# Patient Record
Sex: FEMALE | Race: WHITE | NOT HISPANIC OR LATINO | Employment: UNEMPLOYED | URBAN - METROPOLITAN AREA
[De-identification: names, ages, dates, MRNs, and addresses within clinical notes are randomized per-mention and may not be internally consistent; named-entity substitution may affect disease eponyms.]

---

## 2023-03-31 LAB
ALANINE AMINOTRANSFERASE (SGPT) (U/L) IN SER/PLAS: 5 U/L (ref 7–45)
ALBUMIN (G/DL) IN SER/PLAS: 4.6 G/DL (ref 3.4–5)
ALKALINE PHOSPHATASE (U/L) IN SER/PLAS: 48 U/L (ref 33–110)
ANION GAP IN SER/PLAS: 15 MMOL/L (ref 10–20)
ANTI-NUCLEAR ANTIBODY (ANA): NEGATIVE
ASPARTATE AMINOTRANSFERASE (SGOT) (U/L) IN SER/PLAS: 12 U/L (ref 9–39)
BASOPHILS (10*3/UL) IN BLOOD BY AUTOMATED COUNT: 0.04 X10E9/L (ref 0–0.1)
BASOPHILS/100 LEUKOCYTES IN BLOOD BY AUTOMATED COUNT: 0.8 % (ref 0–2)
BILIRUBIN TOTAL (MG/DL) IN SER/PLAS: 0.7 MG/DL (ref 0–1.2)
CALCIDIOL (25 OH VITAMIN D3) (NG/ML) IN SER/PLAS: 24 NG/ML
CALCIUM (MG/DL) IN SER/PLAS: 10.3 MG/DL (ref 8.6–10.6)
CARBON DIOXIDE, TOTAL (MMOL/L) IN SER/PLAS: 27 MMOL/L (ref 21–32)
CHLORIDE (MMOL/L) IN SER/PLAS: 103 MMOL/L (ref 98–107)
CREATININE (MG/DL) IN SER/PLAS: 0.66 MG/DL (ref 0.5–1.05)
EOSINOPHILS (10*3/UL) IN BLOOD BY AUTOMATED COUNT: 0.04 X10E9/L (ref 0–0.7)
EOSINOPHILS/100 LEUKOCYTES IN BLOOD BY AUTOMATED COUNT: 0.8 % (ref 0–6)
ERYTHROCYTE DISTRIBUTION WIDTH (RATIO) BY AUTOMATED COUNT: 11.7 % (ref 11.5–14.5)
ERYTHROCYTE MEAN CORPUSCULAR HEMOGLOBIN CONCENTRATION (G/DL) BY AUTOMATED: 33.4 G/DL (ref 32–36)
ERYTHROCYTE MEAN CORPUSCULAR VOLUME (FL) BY AUTOMATED COUNT: 91 FL (ref 80–100)
ERYTHROCYTES (10*6/UL) IN BLOOD BY AUTOMATED COUNT: 4.83 X10E12/L (ref 4–5.2)
GFR FEMALE: >90 ML/MIN/1.73M2
GLUCOSE (MG/DL) IN SER/PLAS: 73 MG/DL (ref 74–99)
HEMATOCRIT (%) IN BLOOD BY AUTOMATED COUNT: 44 % (ref 36–46)
HEMOGLOBIN (G/DL) IN BLOOD: 14.7 G/DL (ref 12–16)
IMMATURE GRANULOCYTES/100 LEUKOCYTES IN BLOOD BY AUTOMATED COUNT: 0.2 % (ref 0–0.9)
IMMUNOCAP IGE: 5 KU/L (ref 0–214)
LEUKOCYTES (10*3/UL) IN BLOOD BY AUTOMATED COUNT: 5.3 X10E9/L (ref 4.4–11.3)
LYMPHOCYTES (10*3/UL) IN BLOOD BY AUTOMATED COUNT: 1.34 X10E9/L (ref 1.2–4.8)
LYMPHOCYTES/100 LEUKOCYTES IN BLOOD BY AUTOMATED COUNT: 25.4 % (ref 13–44)
MONOCYTES (10*3/UL) IN BLOOD BY AUTOMATED COUNT: 0.41 X10E9/L (ref 0.1–1)
MONOCYTES/100 LEUKOCYTES IN BLOOD BY AUTOMATED COUNT: 7.8 % (ref 2–10)
NEUTROPHILS (10*3/UL) IN BLOOD BY AUTOMATED COUNT: 3.43 X10E9/L (ref 1.2–7.7)
NEUTROPHILS/100 LEUKOCYTES IN BLOOD BY AUTOMATED COUNT: 65 % (ref 40–80)
NRBC (PER 100 WBCS) BY AUTOMATED COUNT: 0 /100 WBC (ref 0–0)
PLATELETS (10*3/UL) IN BLOOD AUTOMATED COUNT: 312 X10E9/L (ref 150–450)
POTASSIUM (MMOL/L) IN SER/PLAS: 4.8 MMOL/L (ref 3.5–5.3)
PROTEIN TOTAL: 7.8 G/DL (ref 6.4–8.2)
SEDIMENTATION RATE, ERYTHROCYTE: 3 MM/H (ref 0–20)
SODIUM (MMOL/L) IN SER/PLAS: 140 MMOL/L (ref 136–145)
THYROPEROXIDASE AB (IU/ML) IN SER/PLAS: <28 IU/ML
THYROTROPIN (MIU/L) IN SER/PLAS BY DETECTION LIMIT <= 0.05 MIU/L: 1.1 MIU/L (ref 0.44–3.98)
UREA NITROGEN (MG/DL) IN SER/PLAS: 9 MG/DL (ref 6–23)

## 2023-04-03 LAB — THYROGLOBULIN AB (IU/ML) IN SER/PLAS: 2.1 IU/ML (ref 0–4)

## 2023-04-05 LAB — TRYPTASE: 2.7 MCG/L

## 2023-04-06 LAB — URTICARIA-INDUCING ACTIVITY: 6.2

## 2023-05-05 ENCOUNTER — APPOINTMENT (OUTPATIENT)
Dept: LAB | Facility: LAB | Age: 23
End: 2023-05-05
Payer: COMMERCIAL

## 2023-05-05 LAB
ALLERGEN MICROORGANISM: CANDIDA ALBICANS IGE (KU/L): <0.1 KU/L
APPEARANCE, URINE: ABNORMAL
BACTERIA, URINE: ABNORMAL /HPF
BASOPHILS (10*3/UL) IN BLOOD BY AUTOMATED COUNT: 0.02 X10E9/L (ref 0–0.1)
BASOPHILS/100 LEUKOCYTES IN BLOOD BY AUTOMATED COUNT: 0.3 % (ref 0–2)
BILIRUBIN, URINE: NEGATIVE
BLOOD, URINE: ABNORMAL
COLOR, URINE: YELLOW
EOSINOPHILS (10*3/UL) IN BLOOD BY AUTOMATED COUNT: 0.02 X10E9/L (ref 0–0.7)
EOSINOPHILS/100 LEUKOCYTES IN BLOOD BY AUTOMATED COUNT: 0.3 % (ref 0–6)
ERYTHROCYTE DISTRIBUTION WIDTH (RATIO) BY AUTOMATED COUNT: 11.7 % (ref 11.5–14.5)
ERYTHROCYTE MEAN CORPUSCULAR HEMOGLOBIN CONCENTRATION (G/DL) BY AUTOMATED: 33.8 G/DL (ref 32–36)
ERYTHROCYTE MEAN CORPUSCULAR VOLUME (FL) BY AUTOMATED COUNT: 91 FL (ref 80–100)
ERYTHROCYTES (10*6/UL) IN BLOOD BY AUTOMATED COUNT: 4.73 X10E12/L (ref 4–5.2)
GLUCOSE, URINE: NEGATIVE MG/DL
HEMATOCRIT (%) IN BLOOD BY AUTOMATED COUNT: 43.2 % (ref 36–46)
HEMOGLOBIN (G/DL) IN BLOOD: 14.6 G/DL (ref 12–16)
IMMATURE GRANULOCYTES/100 LEUKOCYTES IN BLOOD BY AUTOMATED COUNT: 0.1 % (ref 0–0.9)
IMMUNOCAP INTERPRETATION: NORMAL
KETONES, URINE: NEGATIVE MG/DL
LEUKOCYTE ESTERASE, URINE: ABNORMAL
LEUKOCYTES (10*3/UL) IN BLOOD BY AUTOMATED COUNT: 7.1 X10E9/L (ref 4.4–11.3)
LYMPHOCYTES (10*3/UL) IN BLOOD BY AUTOMATED COUNT: 1.71 X10E9/L (ref 1.2–4.8)
LYMPHOCYTES/100 LEUKOCYTES IN BLOOD BY AUTOMATED COUNT: 24.2 % (ref 13–44)
MONOCYTES (10*3/UL) IN BLOOD BY AUTOMATED COUNT: 0.42 X10E9/L (ref 0.1–1)
MONOCYTES/100 LEUKOCYTES IN BLOOD BY AUTOMATED COUNT: 5.9 % (ref 2–10)
MUCUS, URINE: ABNORMAL /LPF
NEUTROPHILS (10*3/UL) IN BLOOD BY AUTOMATED COUNT: 4.9 X10E9/L (ref 1.2–7.7)
NEUTROPHILS/100 LEUKOCYTES IN BLOOD BY AUTOMATED COUNT: 69.2 % (ref 40–80)
NITRITE, URINE: NEGATIVE
NRBC (PER 100 WBCS) BY AUTOMATED COUNT: 0 /100 WBC (ref 0–0)
PH, URINE: 6 (ref 5–8)
PLATELETS (10*3/UL) IN BLOOD AUTOMATED COUNT: 286 X10E9/L (ref 150–450)
PROTEIN, URINE: NEGATIVE MG/DL
RBC, URINE: 8 /HPF (ref 0–5)
SPECIFIC GRAVITY, URINE: 1.02 (ref 1–1.03)
SQUAMOUS EPITHELIAL CELLS, URINE: 12 /HPF
UROBILINOGEN, URINE: <2 MG/DL (ref 0–1.9)
WBC, URINE: 3 /HPF (ref 0–5)

## 2023-05-06 LAB
APPEARANCE, URINE: CLEAR
BILIRUBIN, URINE: NEGATIVE
BLOOD, URINE: NEGATIVE
COLOR, URINE: YELLOW
GLUCOSE, URINE: NEGATIVE MG/DL
KETONES, URINE: ABNORMAL MG/DL
LEUKOCYTE ESTERASE, URINE: NEGATIVE
NITRITE, URINE: NEGATIVE
PH, URINE: 7 (ref 5–8)
PROTEIN, URINE: NEGATIVE MG/DL
SPECIFIC GRAVITY, URINE: 1.01 (ref 1–1.03)
UROBILINOGEN, URINE: <2 MG/DL (ref 0–1.9)

## 2023-05-07 LAB — URINE CULTURE: NORMAL

## 2023-07-04 DIAGNOSIS — L70.9 ACNE, UNSPECIFIED: ICD-10-CM

## 2023-07-05 PROBLEM — B34.9 VIRAL ILLNESS: Status: ACTIVE | Noted: 2023-07-05

## 2023-07-05 PROBLEM — R05.9 COUGH: Status: ACTIVE | Noted: 2023-07-05

## 2023-07-05 PROBLEM — R10.9 ABDOMINAL PAIN: Status: ACTIVE | Noted: 2023-07-05

## 2023-07-05 PROBLEM — J32.9 SINUSITIS: Status: ACTIVE | Noted: 2023-07-05

## 2023-07-05 PROBLEM — J02.9 ACUTE PHARYNGITIS: Status: ACTIVE | Noted: 2023-07-05

## 2023-07-05 PROBLEM — L29.9 PRURITUS: Status: ACTIVE | Noted: 2023-07-05

## 2023-07-05 PROBLEM — R31.29 OTHER MICROSCOPIC HEMATURIA: Status: ACTIVE | Noted: 2023-07-05

## 2023-07-05 PROBLEM — R30.0 DYSURIA: Status: ACTIVE | Noted: 2023-07-05

## 2023-07-05 PROBLEM — N76.0 VAGINITIS: Status: ACTIVE | Noted: 2023-07-05

## 2023-07-05 PROBLEM — L50.3 DERMOGRAPHIA: Status: ACTIVE | Noted: 2023-07-05

## 2023-07-05 PROBLEM — R31.9 HEMATURIA: Status: ACTIVE | Noted: 2023-07-05

## 2023-07-05 PROBLEM — L70.9 ACNE: Status: ACTIVE | Noted: 2023-07-05

## 2023-07-05 PROBLEM — E78.1 HYPERTRIGLYCERIDEMIA: Status: ACTIVE | Noted: 2023-07-05

## 2023-07-05 PROBLEM — R11.10 POST-TUSSIVE EMESIS: Status: ACTIVE | Noted: 2023-07-05

## 2023-07-05 RX ORDER — KETOCONAZOLE 20 MG/G
CREAM TOPICAL
COMMUNITY
Start: 2022-12-09 | End: 2023-07-07 | Stop reason: WASHOUT

## 2023-07-05 RX ORDER — DESONIDE 0.5 MG/G
CREAM TOPICAL 2 TIMES DAILY
COMMUNITY
Start: 2022-10-07 | End: 2023-07-07 | Stop reason: WASHOUT

## 2023-07-05 RX ORDER — HYDROCORTISONE 25 MG/G
CREAM TOPICAL
COMMUNITY
Start: 2022-09-13 | End: 2023-07-07 | Stop reason: WASHOUT

## 2023-07-05 RX ORDER — CLINDAMYCIN PHOSPHATE 10 UG/ML
LOTION TOPICAL
COMMUNITY
Start: 2022-09-13

## 2023-07-05 RX ORDER — TRETINOIN 0.25 MG/G
CREAM TOPICAL
COMMUNITY
Start: 2022-09-13

## 2023-07-05 RX ORDER — CETIRIZINE HYDROCHLORIDE 10 MG/1
10 TABLET ORAL DAILY
COMMUNITY
Start: 2023-03-30

## 2023-07-05 RX ORDER — NORGESTIMATE AND ETHINYL ESTRADIOL 7DAYSX3 LO
1 KIT ORAL DAILY
COMMUNITY
End: 2023-07-07 | Stop reason: SDUPTHER

## 2023-07-05 RX ORDER — KETOCONAZOLE 20 MG/ML
SHAMPOO, SUSPENSION TOPICAL
COMMUNITY
Start: 2023-01-10

## 2023-07-05 RX ORDER — MONTELUKAST SODIUM 10 MG/1
10 TABLET ORAL NIGHTLY
COMMUNITY
Start: 2023-05-23 | End: 2024-06-05

## 2023-07-07 ENCOUNTER — OFFICE VISIT (OUTPATIENT)
Dept: PRIMARY CARE | Facility: CLINIC | Age: 23
End: 2023-07-07
Payer: COMMERCIAL

## 2023-07-07 VITALS
HEART RATE: 76 BPM | DIASTOLIC BLOOD PRESSURE: 65 MMHG | SYSTOLIC BLOOD PRESSURE: 99 MMHG | HEIGHT: 62 IN | OXYGEN SATURATION: 96 % | TEMPERATURE: 98.5 F | RESPIRATION RATE: 16 BRPM | BODY MASS INDEX: 17 KG/M2 | WEIGHT: 92.4 LBS

## 2023-07-07 DIAGNOSIS — R63.6 LOW WEIGHT: ICD-10-CM

## 2023-07-07 DIAGNOSIS — N76.1 CHRONIC VAGINITIS: Primary | ICD-10-CM

## 2023-07-07 DIAGNOSIS — Z00.00 WELL EXAM WITHOUT ABNORMAL FINDINGS OF PATIENT 18 YEARS OF AGE OR OLDER: ICD-10-CM

## 2023-07-07 DIAGNOSIS — N94.6 DYSMENORRHEA: ICD-10-CM

## 2023-07-07 DIAGNOSIS — E55.9 VITAMIN D DEFICIENCY: ICD-10-CM

## 2023-07-07 PROCEDURE — 99395 PREV VISIT EST AGE 18-39: CPT | Performed by: FAMILY MEDICINE

## 2023-07-07 PROCEDURE — 1036F TOBACCO NON-USER: CPT | Performed by: FAMILY MEDICINE

## 2023-07-07 RX ORDER — NORGESTIMATE AND ETHINYL ESTRADIOL 7DAYSX3 LO
1 KIT ORAL DAILY
Qty: 84 TABLET | Refills: 3 | Status: SHIPPED | OUTPATIENT
Start: 2023-07-07 | End: 2024-06-08

## 2023-07-07 RX ORDER — FLUTICASONE PROPIONATE 0.5 MG/G
CREAM TOPICAL
Qty: 15 G | Refills: 0 | Status: SHIPPED | OUTPATIENT
Start: 2023-07-07

## 2023-07-07 NOTE — PROGRESS NOTES
"Subjective   Patient ID: Kaur Day is a 22 y.o. female who presents for Annual Exam (Pt is here for annual physical exam, ABN given to pt and signed, pt verbalized understanding. No concerns at this time ).    HPI     Here for wellness     Not exercising     Nutrition good     Does not eat beef      Denies depression or anxiety    Wants to go to school for dental hygiene     Sex active   - not currently     Both partners were male     Condoms every time     Doesn't eat much because she doesn't feel like making stuff       Review of Systems    No cp, sob, palpitations      Objective   BP 99/65 (BP Location: Left arm, Patient Position: Sitting, BP Cuff Size: Small adult)   Pulse 76   Temp 36.9 °C (98.5 °F) (Temporal)   Resp 16   Ht 1.575 m (5' 2\")   Wt (!) 41.9 kg (92 lb 6.4 oz)   LMP 06/23/2023 (Approximate)   SpO2 96%   BMI 16.90 kg/m²     Physical Exam  Constitutional:       General: She is not in acute distress.     Appearance: Normal appearance.   Cardiovascular:      Rate and Rhythm: Normal rate and regular rhythm.      Heart sounds: Normal heart sounds.   Pulmonary:      Effort: Pulmonary effort is normal.      Breath sounds: Normal breath sounds. No wheezing, rhonchi or rales.   Abdominal:      Palpations: Abdomen is soft.      Tenderness: There is no abdominal tenderness. There is no guarding or rebound.   Musculoskeletal:      Right lower leg: No edema.      Left lower leg: No edema.   Neurological:      Mental Status: She is alert.   Psychiatric:         Mood and Affect: Mood normal.             Assessment/Plan   Diagnoses and all orders for this visit:  Chronic vaginitis  -     fluticasone (Cutivate) 0.05 % cream; Appy to rash/ itchy area twice daily x 5 d  Well exam without abnormal findings of patient 18 years of age or older  Vitamin D deficiency  Low weight       Chronic vaginitis-patient has seen allergy immunology, dermatology, OB/GYN.  She has been tried on antifungals, treated as an " allergic type reaction, and has had a low potency steroid.    None of those have been helpful for vaginal pruritus, though her systemic pruritus was improved somewhat with the montelukast.    It is possible at this point that there is an itch scratch cycle, and quieting the inflammation that might be triggering that itch may be helpful.  Patient was given a small tube of clobetasol to use twice daily for no more than 5 days.  She is aware that this is not for long-term use on the genitals.    Keep your follow-up with Dr. Morgan for next steps, and consideration of Xolair if symptoms persist.    Well exam-patient is up-to-date on all vaccinations, as well as Pap smear.  Colon cancer screening is not needed until the age of 45, breast cancer screening not needed until the age of 40.    Nutrition-patient is advised to increase calorie dense healthy food, such as seeds, nuts, nut butters.   Patient does have a low BMI, BMI today 16.9.  She denies body dysmorphic syndrome, and states sometimes there is just not that much food around the house and she does not feel like making it.  Patient is encouraged to participate in shopping and having some food prepped for easy eating.  Calorie dense very convenient foods can include Greek yogurt, cottage cheese, eggs.    Vaccinations-patient is up-to-date on all vaccinations.  She is not due again for tetanus with whooping cough until 2030.    Follow-up with me in 1 year for well check, or sooner as needed.

## 2023-07-08 RX ORDER — NORGESTIMATE AND ETHINYL ESTRADIOL 7DAYSX3 LO
KIT ORAL
Qty: 84 TABLET | Refills: 1 | Status: SHIPPED | OUTPATIENT
Start: 2023-07-08

## 2024-06-05 DIAGNOSIS — L50.8 OTHER URTICARIA: ICD-10-CM

## 2024-06-05 DIAGNOSIS — R05.8 OTHER COUGH: Primary | ICD-10-CM

## 2024-06-05 RX ORDER — MONTELUKAST SODIUM 10 MG/1
10 TABLET ORAL NIGHTLY
Qty: 90 TABLET | Refills: 0 | Status: SHIPPED | OUTPATIENT
Start: 2024-06-05

## 2024-06-08 DIAGNOSIS — N94.6 DYSMENORRHEA: ICD-10-CM

## 2024-06-08 RX ORDER — NORGESTIMATE AND ETHINYL ESTRADIOL 7DAYSX3 LO
1 KIT ORAL DAILY
Qty: 84 TABLET | Refills: 0 | Status: SHIPPED | OUTPATIENT
Start: 2024-06-08

## 2024-07-08 ENCOUNTER — APPOINTMENT (OUTPATIENT)
Dept: PRIMARY CARE | Facility: CLINIC | Age: 24
End: 2024-07-08
Payer: COMMERCIAL

## 2024-07-26 ENCOUNTER — PATIENT OUTREACH (OUTPATIENT)
Dept: CARE COORDINATION | Facility: CLINIC | Age: 24
End: 2024-07-26
Payer: COMMERCIAL

## 2024-07-26 ENCOUNTER — DOCUMENTATION (OUTPATIENT)
Dept: CARE COORDINATION | Facility: CLINIC | Age: 24
End: 2024-07-26
Payer: COMMERCIAL

## 2024-07-26 RX ORDER — KETOROLAC TROMETHAMINE 10 MG/1
10 TABLET, FILM COATED ORAL EVERY 6 HOURS PRN
COMMUNITY

## 2024-07-26 RX ORDER — TAMSULOSIN HYDROCHLORIDE 0.4 MG/1
0.4 CAPSULE ORAL NIGHTLY
COMMUNITY

## 2024-07-26 RX ORDER — CIPROFLOXACIN 500 MG/1
500 TABLET ORAL 2 TIMES DAILY
COMMUNITY

## 2024-07-26 RX ORDER — PHENAZOPYRIDINE HYDROCHLORIDE 200 MG/1
200 TABLET, FILM COATED ORAL 3 TIMES DAILY PRN
COMMUNITY

## 2024-07-26 NOTE — PROGRESS NOTES
Discharge Facility:[facility]  Discharge Diagnosis:Nephrolithiasis  Admission Date:7/25/2024  Discharge Date: 7/25/2024    PCP Appointment Date:7/30/2024 for CPE  Specialist Appointment Date: Urology TBD   Hospital Encounter and Summary Linked: Yes  See discharge assessment below for further details  Engagement  Call Start Time: 1200 (7/26/2024 12:03 PM)    Medications  Medications reviewed with patient/caregiver?: Yes (7/26/2024 12:03 PM)  Is the patient having any side effects they believe may be caused by any medication additions or changes?: No (7/26/2024 12:03 PM)  Does the patient have all medications ordered at discharge?: Yes (7/26/2024 12:03 PM)  Care Management Interventions: No intervention needed (7/26/2024 12:03 PM)  Prescription Comments: -- (Cipro 500 mg one bid x 7 days, Torodol 10 mg one q 6 hours prn, Pyridium 200 mg one tid prn, Flomax 0.4 one q hs x 7 days) (7/26/2024 12:03 PM)  Is the patient taking all medications as directed (includes completed medication regime)?: Yes (7/26/2024 12:03 PM)    Appointments  Does the patient have a primary care provider?: Yes (7/26/2024 12:03 PM)  Care Management Interventions: Verified appointment date/time/provider (7/30/2024 for CPE) (7/26/2024 12:03 PM)  Has the patient kept scheduled appointments due by today?: Yes (7/26/2024 12:03 PM)  Care Management Interventions: Advised patient to keep appointment (7/26/2024 12:03 PM)    Self Management  What is the home health agency?: -- (N/A) (7/26/2024 12:03 PM)  What Durable Medical Equipment (DME) was ordered?: -- (N/A) (7/26/2024 12:03 PM)    Patient Teaching  Does the patient have access to their discharge instructions?: Yes (7/26/2024 12:03 PM)  What is the patient's perception of their health status since discharge?: Improving (7/26/2024 12:03 PM)  Is the patient/caregiver able to teach back the hierarchy of who to call/visit for symptoms/problems? PCP, Specialist, Home Health nurse, Urgent Care, ED, 911:  Yes (7/26/2024 12:03 PM)    Wrap Up  Wrap Up Additional Comments: -- (Summer is doing ok. She is straining her urine and stent was placed. She is to recieve call from urology for schedule of Lithotripsy. She has CPE scheduled with PCP next week so will keep. She will contact provider if any concerns) (7/26/2024 12:03 PM)

## 2024-07-29 PROBLEM — Z00.00 HEALTHCARE MAINTENANCE: Status: ACTIVE | Noted: 2024-07-29

## 2024-07-29 NOTE — PROGRESS NOTES
Subjective   Patient ID: Kaur Day is a 23 y.o. female who presents for Annual Exam.    HPI     Patient presents today for annual physical + hospital discharge follow-up.    Interim Hx:  Patient hospitalized at [facility] 7/25/2024 for right ureteral stent placement 2/2 nephrolithiasis and hydronephrosis. Summary per visit note:     She presented to [place] ER morning of 7/25/2024 with right sided flank pain that has been present for last few days. She endorses chills, no fever. + nausea, mild dysuria. No hematuria.     In the ER, she had no leukocytosis, SCr 0.75, UA was nitriite +. Urine culture sent. She was started on ceftriaxone. She has been afebrile. She was transferred to [facility] for further management of stone.      Pertinent Hospital workup:  Wbc: 9.14  Hgb: 13.4  Scr: 0.75  UA: orange, 3+ hgb, nitrite +, 6-10 wbc, >25 rbc, many bacteria  Ucx: in process   CT A/P: Mild right hydroureteronephrosis with a 5 mm stone in the region of the distal right ureter just proximal to the ureterovesical junction.  Patient was admitted for the above surgery. Patient was transferred to the recovery unit and then to the regular nursing floor. Diet was slowly advanced as tolerated. Activity level was gradually increased. Pain was controlled on oral medications. The patient was then deemed fit for discharge.    Consulted urology, seen by Dr. Beck in hospital with plan as follows:  -OR today for surgical management of the stone  - NPO   - Wbc: 9.14  - Hgb: 13.4  - Scr: 0.75  - UA: orange, 3+ hgb, nitrite +, 6-10 wbc, >25 rbc, many bacteria  - Urine culture: in process  - IVF  - IV Abx- had ceftriaxone at 430am in ER  - Start Flomax  - Pain/nausea control  - check KUB  - Strain urine  - Page with fevers/hypotension  - Options for stone treatment to be discussed with the patient including MET. Pt opting for surgical management of stone    Patient discharged post-op on Cirpo, Toradol, Flomax, and Pyridium.    Remains on the Cipro  at this time.  Patient has been straining when going to the bathroom.  Patient reports she will be having stent removed this Thursday  She endorses that she was not having water intake when on vacation in [place].  Hx of stones in 2018, passed these on her own, had stone analysis which showed calcium phosphate 80%, urine showed low citrate.    WELLNESS VISIT   TDAP: 10/2020  GARDASIL: completed  PAP: 5/2023 neg, managed via GYN  MAMMO: n/a  CSCOPE: n/a  HEP C SCREEN: 1/2023 negative  LIPID: 2/2020 TC/HDL ratio 4.0,     Alcohol use: not currently  Smoking: never smoker    Dental: utd, works at dental office  Vision: utd    STI screening: no concern for STIs per patient    Cervical cancer screening: utd via GYN  Denies family history in first degree relative.  Denies pelvic pain, vaginal discharge, or vaginal bleeding.    Breast cancer screening: n/a  Denies family history in first degree relative.  Denies lumps/bumps, skin changes, nipple retraction, or nipple drainage.    Colon cancer screening: n/a  Denies family history in first degree relative.  Denies melena, hematochezia, constipation, diarrhea, bloating, change in bowel habits.    Thyroid disorder screening:   Denies family history in first degree relative.  Denies heat or cold intolerance, diarrhea or constipation, coarsening of hair, and palpitations.     Cardiac disorder screening:   Denies family history in first degree relative.  Denies chest pain, SOB, palpitations, edema, dizziness.     Review of Systems   All other systems reviewed and are negative.      Objective   /64 (BP Location: Right arm, Patient Position: Sitting, BP Cuff Size: Adult)   Pulse 81   Temp 36.9 °C (98.4 °F) (Temporal)   Wt (!) 44.4 kg (97 lb 14.4 oz)   LMP 07/21/2024   SpO2 94%   BMI 17.91 kg/m²     Physical Exam  Vitals and nursing note reviewed.   Constitutional:       General: She is not in acute distress.     Appearance: Normal appearance. She is not  toxic-appearing.   HENT:      Head: Normocephalic and atraumatic.      Mouth/Throat:      Mouth: Mucous membranes are moist.      Pharynx: Oropharynx is clear.   Eyes:      Extraocular Movements: Extraocular movements intact.      Pupils: Pupils are equal, round, and reactive to light.   Neck:      Thyroid: No thyromegaly.      Vascular: No hepatojugular reflux or JVD.   Cardiovascular:      Rate and Rhythm: Normal rate and regular rhythm.      Heart sounds: No murmur heard.     No friction rub. No gallop.   Pulmonary:      Effort: Pulmonary effort is normal.      Breath sounds: Normal breath sounds. No wheezing, rhonchi or rales.   Abdominal:      General: Bowel sounds are normal. There is no distension.      Palpations: Abdomen is soft. There is no mass.      Tenderness: There is no abdominal tenderness. There is no guarding.   Musculoskeletal:      Right lower leg: No edema.      Left lower leg: No edema.   Lymphadenopathy:      Cervical: No cervical adenopathy.   Skin:     General: Skin is warm and dry.   Neurological:      General: No focal deficit present.      Mental Status: She is alert and oriented to person, place, and time.   Psychiatric:         Mood and Affect: Mood normal.         Behavior: Behavior normal.         Assessment/Plan   Problem List Items Addressed This Visit             ICD-10-CM    Healthcare maintenance - Primary Z00.00     Vaccines and screenings reviewed.  Questionnaires completed.  Health and wellness topics reviewed.  Diet and exercise recommendations revisited.  Routine blood work deferred today, recently in ER    VACCINES:  -TDAP is good until 2030  -Gardasil previously completed, good for lifetime    SCREENING:  -No concern for STIs per patient  -Screening pap last completed 5/2023, utd managed by GYN  -Screening mammogram indicated at age 40  -Screening colonoscopy indicated at age 45    LIFESTYLE MEASURES  -consider increasing protein intake provided no issues with kidneys to 1  gram per 1 pound of ideal body weight per not to exceed 150 gram per day. May have to supplement with a protein powder to achieve this goal.  -make sure you are avoiding refined carbs such as breads, pasta, cereal, candy, soda,  nutrition bars, granola, chips, and sugar sweetened beverages.      -eat 5- 7 servings daily of veggies,  healthy protein such as chicken, fish,  beans, and eggs, and include healthy fats in your diet such as seeds, nuts, olive oil, avocados, and salmon.   -exercise 4 - 6 days per week as you are able, 150 minutes total weekly divided up is recommended with 3-4 of those days including resistance/strength training.  -Vitamin D is recommended at 1000 - 5000 IU international units daily.   -Always wear sunscreen when you have sun exposure.  -64 oz of water is recommended daily.  -Dental visits recommended every 6 months.  -Eye exam recommended every 2 years, for those with vision problems every year.           Nephrolithiasis N20.0     S/p ER visit, still has stent in placed, anticipating removal/lithotripsy via urology in 3-4 days.  Discharged by ER on Cipro, Toradol, Flomax, and Pyridium, continue as prescribed.  She will follow-up as outpatient with urology for continued management.  Her urologist is Dr. Beck at [facility].    Remote history of stones, had stone analysis which showed calcium phosphate 80%, urine showed low citrate which would have been treated with uro cit K and to push lemonade.    Make sure to drink at least 64 oz of water daily - preferably 3L per day.  Drink at least  8 - 12 oz of water at night before bed.          Ureteral stone with hydronephrosis N13.2    Hospital discharge follow-up Z09     Patient hospitalized at [facility] 7/25/2024 for right ureteral stent placement 2/2 nephrolithiasis and hydronephrosis. Summary per visit note:  She presented to [place] ER morning of 7/25/2024 with right sided flank pain that has been present for last few days. She endorses chills, no fever.  + nausea, mild dysuria. No hematuria.     In the ER, she had no leukocytosis, SCr 0.75, UA was nitriite +. Urine culture sent. She was started on ceftriaxone. She has been afebrile. She was transferred to [facility] for further management of stone.      Pertinent Hospital workup:  Wbc: 9.14  Hgb: 13.4  Scr: 0.75  UA: orange, 3+ hgb, nitrite +, 6-10 wbc, >25 rbc, many bacteria  Ucx: in process   CT A/P: Mild right hydroureteronephrosis with a 5 mm stone in the region of the distal right ureter just proximal to the ureterovesical junction.  Patient was admitted for the above surgery. Patient was transferred to the recovery unit and then to the regular nursing floor. Diet was slowly advanced as tolerated. Activity level was gradually increased. Pain was controlled on oral medications. The patient was then deemed fit for discharge.    Consulted urology, seen by Dr. Beck in hospital with plan as follows:  -OR today for surgical management of the stone  - NPO   - Wbc: 9.14  - Hgb: 13.4  - Scr: 0.75  - UA: orange, 3+ hgb, nitrite +, 6-10 wbc, >25 rbc, many bacteria  - Urine culture: in process  - IVF  - IV Abx- had ceftriaxone at 430am in ER  - Start Flomax  - Pain/nausea control  - check KUB  - Strain urine  - Page with fevers/hypotension  - Options for stone treatment to be discussed with the patient including MET. Pt opting for surgical management of stone    Patient discharged post-op on Cirpo, Toradol, Flomax, and Pyridium.    Hospital course and summary reviewed and discussed with patient.  Patient remains on meds from discharge  Awaiting outpatient follow-up with urology @ [facility] this week, anticipating lithrotripsy.          Other Visit Diagnoses         Codes    Body mass index (BMI) less than 19 in adult     Z68.1          Follow-up in 1 year for annual physical.   Call for sooner follow-up if needed.         Scribe Attestation  By signing my name below, Jackie SANCHEZ Scribe   attest that this documentation has  been prepared under the direction and in the presence of Mavis John DO.

## 2024-07-30 ENCOUNTER — APPOINTMENT (OUTPATIENT)
Dept: PRIMARY CARE | Facility: CLINIC | Age: 24
End: 2024-07-30
Payer: COMMERCIAL

## 2024-07-30 VITALS
HEART RATE: 81 BPM | DIASTOLIC BLOOD PRESSURE: 64 MMHG | WEIGHT: 97.9 LBS | TEMPERATURE: 98.4 F | BODY MASS INDEX: 17.91 KG/M2 | OXYGEN SATURATION: 94 % | SYSTOLIC BLOOD PRESSURE: 105 MMHG

## 2024-07-30 DIAGNOSIS — N20.0 NEPHROLITHIASIS: ICD-10-CM

## 2024-07-30 DIAGNOSIS — Z00.00 HEALTHCARE MAINTENANCE: Primary | ICD-10-CM

## 2024-07-30 DIAGNOSIS — N13.2 URETERAL STONE WITH HYDRONEPHROSIS: ICD-10-CM

## 2024-07-30 DIAGNOSIS — Z09 HOSPITAL DISCHARGE FOLLOW-UP: ICD-10-CM

## 2024-07-30 PROBLEM — J32.9 SINUSITIS: Status: RESOLVED | Noted: 2023-07-05 | Resolved: 2024-07-30

## 2024-07-30 PROBLEM — R31.9 HEMATURIA: Status: RESOLVED | Noted: 2023-07-05 | Resolved: 2024-07-30

## 2024-07-30 PROBLEM — N76.0 VAGINITIS: Status: RESOLVED | Noted: 2023-07-05 | Resolved: 2024-07-30

## 2024-07-30 PROBLEM — R31.29 OTHER MICROSCOPIC HEMATURIA: Status: RESOLVED | Noted: 2023-07-05 | Resolved: 2024-07-30

## 2024-07-30 PROBLEM — L29.9 PRURITUS: Status: RESOLVED | Noted: 2023-07-05 | Resolved: 2024-07-30

## 2024-07-30 PROBLEM — R10.9 ABDOMINAL PAIN: Status: RESOLVED | Noted: 2023-07-05 | Resolved: 2024-07-30

## 2024-07-30 PROBLEM — N39.0 URINARY TRACT INFECTION WITH HEMATURIA: Status: ACTIVE | Noted: 2024-07-25

## 2024-07-30 PROBLEM — R30.0 DYSURIA: Status: RESOLVED | Noted: 2023-07-05 | Resolved: 2024-07-30

## 2024-07-30 PROBLEM — R05.9 COUGH: Status: RESOLVED | Noted: 2023-07-05 | Resolved: 2024-07-30

## 2024-07-30 PROBLEM — R31.9 URINARY TRACT INFECTION WITH HEMATURIA: Status: ACTIVE | Noted: 2024-07-25

## 2024-07-30 PROBLEM — J02.9 ACUTE PHARYNGITIS: Status: RESOLVED | Noted: 2023-07-05 | Resolved: 2024-07-30

## 2024-07-30 PROBLEM — R31.9 URINARY TRACT INFECTION WITH HEMATURIA: Status: RESOLVED | Noted: 2024-07-25 | Resolved: 2024-07-30

## 2024-07-30 PROBLEM — R11.10 POST-TUSSIVE EMESIS: Status: RESOLVED | Noted: 2023-07-05 | Resolved: 2024-07-30

## 2024-07-30 PROBLEM — B34.9 VIRAL ILLNESS: Status: RESOLVED | Noted: 2023-07-05 | Resolved: 2024-07-30

## 2024-07-30 PROBLEM — N39.0 URINARY TRACT INFECTION WITH HEMATURIA: Status: RESOLVED | Noted: 2024-07-25 | Resolved: 2024-07-30

## 2024-07-30 PROCEDURE — 99495 TRANSJ CARE MGMT MOD F2F 14D: CPT | Performed by: FAMILY MEDICINE

## 2024-07-30 PROCEDURE — 1036F TOBACCO NON-USER: CPT | Performed by: FAMILY MEDICINE

## 2024-07-30 PROCEDURE — 99395 PREV VISIT EST AGE 18-39: CPT | Performed by: FAMILY MEDICINE

## 2024-07-30 ASSESSMENT — PATIENT HEALTH QUESTIONNAIRE - PHQ9
2. FEELING DOWN, DEPRESSED OR HOPELESS: NOT AT ALL
SUM OF ALL RESPONSES TO PHQ9 QUESTIONS 1 AND 2: 0
1. LITTLE INTEREST OR PLEASURE IN DOING THINGS: NOT AT ALL

## 2024-07-30 NOTE — ASSESSMENT & PLAN NOTE
Vaccines and screenings reviewed.  Questionnaires completed.  Health and wellness topics reviewed.  Diet and exercise recommendations revisited.  Routine blood work deferred today, recently in ER    VACCINES:  -TDAP is good until 2030  -Gardasil previously completed, good for lifetime    SCREENING:  -No concern for STIs per patient  -Screening pap last completed 5/2023, utd managed by GYN  -Screening mammogram indicated at age 40  -Screening colonoscopy indicated at age 45    LIFESTYLE MEASURES  -consider increasing protein intake provided no issues with kidneys to 1 gram per 1 pound of ideal body weight per not to exceed 150 gram per day. May have to supplement with a protein powder to achieve this goal.  -make sure you are avoiding refined carbs such as breads, pasta, cereal, candy, soda,  nutrition bars, granola, chips, and sugar sweetened beverages.      -eat 5- 7 servings daily of veggies,  healthy protein such as chicken, fish,  beans, and eggs, and include healthy fats in your diet such as seeds, nuts, olive oil, avocados, and salmon.   -exercise 4 - 6 days per week as you are able, 150 minutes total weekly divided up is recommended with 3-4 of those days including resistance/strength training.  -Vitamin D is recommended at 1000 - 5000 IU international units daily.   -Always wear sunscreen when you have sun exposure.  -64 oz of water is recommended daily.  -Dental visits recommended every 6 months.  -Eye exam recommended every 2 years, for those with vision problems every year.

## 2024-07-30 NOTE — ASSESSMENT & PLAN NOTE
S/p ER visit, still has stent in placed, anticipating removal/lithotripsy via urology in 3-4 days.  Discharged by ER on Cipro, Toradol, Flomax, and Pyridium, continue as prescribed.  She will follow-up as outpatient with urology for continued management.  Her urologist is Dr. Beck at [facility].    Remote history of stones, had stone analysis which showed calcium phosphate 80%, urine showed low citrate which would have been treated with uro cit K and to push lemonade.    Make sure to drink at least 64 oz of water daily - preferably 3L per day.  Drink at least  8 - 12 oz of water at night before bed.

## 2024-07-30 NOTE — PATIENT INSTRUCTIONS
Kidney stone -     push fluid -   goal 3 liters water daily    Previous stone analysis and urine analysis suggests that acidified urine (lemonade, lemon/lime water) may decrease risk of stone formation,     at that time urocit-K was discussed,  patient disinclined at that time -        Summer to discuss with urology at follow up if that would be recommended,      current stone not captured with straining     Nephrolithiasis  S/p ER visit, still has stent in placed, anticipating removal/lithotripsy via urology in 3-4 days.  Discharged by ER on Cipro, Toradol, Flomax, and Pyridium, continue as prescribed.  She will follow-up as outpatient with urology for continued management.  Her urologist is Dr. Beck at [facility].    Remote history of stones, had stone analysis which showed calcium phosphate 80%, urine showed low citrate which would have been treated with uro cit K and to push lemonade.    Make sure to drink at least 64 oz of water daily - preferably 3L per day.  Drink at least  8 - 12 oz of water at night before bed.     Healthcare maintenance  Vaccines and screenings reviewed.  Questionnaires completed.  Health and wellness topics reviewed.  Diet and exercise recommendations revisited.  Routine blood work deferred today, recently in ER    VACCINES:  -TDAP is good until 2030  -Gardasil previously completed, good for lifetime    SCREENING:  -No concern for STIs per patient  -Screening pap last completed 5/2023, utd managed by GYN  -Screening mammogram indicated at age 40  -Screening colonoscopy indicated at age 45    LIFESTYLE MEASURES  -consider increasing protein intake provided no issues with kidneys to 1 gram per 1 pound of ideal body weight per not to exceed 150 gram per day. May have to supplement with a protein powder to achieve this goal.  -make sure you are avoiding refined carbs such as breads, pasta, cereal, candy, soda,  nutrition bars, granola, chips, and sugar sweetened beverages.      -eat 5- 7 servings  daily of veggies,  healthy protein such as chicken, fish,  beans, and eggs, and include healthy fats in your diet such as seeds, nuts, olive oil, avocados, and salmon.   -exercise 4 - 6 days per week as you are able, 150 minutes total weekly divided up is recommended with 3-4 of those days including resistance/strength training.  -Vitamin D is recommended at 1000 - 5000 IU international units daily.   -Always wear sunscreen when you have sun exposure.  -64 oz of water is recommended daily.  -Dental visits recommended every 6 months.  -Eye exam recommended every 2 years, for those with vision problems every year.      Hospital discharge follow-up  Patient hospitalized at [facility] 7/25/2024 for right ureteral stent placement 2/2 nephrolithiasis and hydronephrosis. Summary per visit note:  She presented to [place] ER morning of 7/25/2024 with right sided flank pain that has been present for last few days. She endorses chills, no fever. + nausea, mild dysuria. No hematuria.     In the ER, she had no leukocytosis, SCr 0.75, UA was nitriite +. Urine culture sent. She was started on ceftriaxone. She has been afebrile. She was transferred to [facility] for further management of stone.      Pertinent Hospital workup:  Wbc: 9.14  Hgb: 13.4  Scr: 0.75  UA: orange, 3+ hgb, nitrite +, 6-10 wbc, >25 rbc, many bacteria  Ucx: in process   CT A/P: Mild right hydroureteronephrosis with a 5 mm stone in the region of the distal right ureter just proximal to the ureterovesical junction.  Patient was admitted for the above surgery. Patient was transferred to the recovery unit and then to the regular nursing floor. Diet was slowly advanced as tolerated. Activity level was gradually increased. Pain was controlled on oral medications. The patient was then deemed fit for discharge.    Consulted urology, seen by Dr. Beck in hospital with plan as follows:  -OR today for surgical management of the stone  - NPO   - Wbc: 9.14  - Hgb: 13.4  - Scr: 0.75  -  UA: orange, 3+ hgb, nitrite +, 6-10 wbc, >25 rbc, many bacteria  - Urine culture: in process  - IVF  - IV Abx- had ceftriaxone at 430am in ER  - Start Flomax  - Pain/nausea control  - check KUB  - Strain urine  - Page with fevers/hypotension  - Options for stone treatment to be discussed with the patient including MET. Pt opting for surgical management of stone    Patient discharged post-op on Cirpo, Toradol, Flomax, and Pyridium.    Hospital course and summary reviewed and discussed with patient.  Patient remains on meds from discharge  Awaiting outpatient follow-up with urology @ [facility] this week, anticipating lithrotripsy.

## 2024-07-30 NOTE — ASSESSMENT & PLAN NOTE
Patient hospitalized at [facility] 7/25/2024 for right ureteral stent placement 2/2 nephrolithiasis and hydronephrosis. Summary per visit note:  She presented to [place] ER morning of 7/25/2024 with right sided flank pain that has been present for last few days. She endorses chills, no fever. + nausea, mild dysuria. No hematuria.     In the ER, she had no leukocytosis, SCr 0.75, UA was nitriite +. Urine culture sent. She was started on ceftriaxone. She has been afebrile. She was transferred to [facility] for further management of stone.      Pertinent Hospital workup:  Wbc: 9.14  Hgb: 13.4  Scr: 0.75  UA: orange, 3+ hgb, nitrite +, 6-10 wbc, >25 rbc, many bacteria  Ucx: in process   CT A/P: Mild right hydroureteronephrosis with a 5 mm stone in the region of the distal right ureter just proximal to the ureterovesical junction.  Patient was admitted for the above surgery. Patient was transferred to the recovery unit and then to the regular nursing floor. Diet was slowly advanced as tolerated. Activity level was gradually increased. Pain was controlled on oral medications. The patient was then deemed fit for discharge.    Consulted urology, seen by Dr. Beck in hospital with plan as follows:  -OR today for surgical management of the stone  - NPO   - Wbc: 9.14  - Hgb: 13.4  - Scr: 0.75  - UA: orange, 3+ hgb, nitrite +, 6-10 wbc, >25 rbc, many bacteria  - Urine culture: in process  - IVF  - IV Abx- had ceftriaxone at 430am in ER  - Start Flomax  - Pain/nausea control  - check KUB  - Strain urine  - Page with fevers/hypotension  - Options for stone treatment to be discussed with the patient including MET. Pt opting for surgical management of stone    Patient discharged post-op on Cirpo, Toradol, Flomax, and Pyridium.    Hospital course and summary reviewed and discussed with patient.  Patient remains on meds from discharge  Awaiting outpatient follow-up with urology @ [facility] this week, anticipating lithrotripsy.

## 2024-08-01 ENCOUNTER — PATIENT OUTREACH (OUTPATIENT)
Dept: CARE COORDINATION | Facility: CLINIC | Age: 24
End: 2024-08-01
Payer: COMMERCIAL

## 2024-08-01 NOTE — PROGRESS NOTES
Call regarding appt. with PCP on 7/30/2024 after hospitalization.  At time of outreach call the patient feels as if their condition has improved since last visit.  Reviewed the PCP appointment with the pt and addressed any questions or concerns.  Summer is doing better, she had stone removal today.

## 2024-09-04 DIAGNOSIS — L70.9 ACNE, UNSPECIFIED: ICD-10-CM

## 2024-09-04 RX ORDER — NORGESTIMATE AND ETHINYL ESTRADIOL 7DAYSX3 LO
1 KIT ORAL DAILY
Qty: 84 TABLET | Refills: 3 | Status: SHIPPED | OUTPATIENT
Start: 2024-09-04

## 2024-09-05 ENCOUNTER — PATIENT OUTREACH (OUTPATIENT)
Dept: PRIMARY CARE | Facility: CLINIC | Age: 24
End: 2024-09-05
Payer: COMMERCIAL

## 2024-10-23 ENCOUNTER — PATIENT OUTREACH (OUTPATIENT)
Dept: PRIMARY CARE | Facility: CLINIC | Age: 24
End: 2024-10-23
Payer: COMMERCIAL

## 2025-07-30 ENCOUNTER — APPOINTMENT (OUTPATIENT)
Dept: PRIMARY CARE | Facility: CLINIC | Age: 25
End: 2025-07-30
Payer: COMMERCIAL